# Patient Record
Sex: FEMALE | Race: BLACK OR AFRICAN AMERICAN | Employment: FULL TIME | URBAN - METROPOLITAN AREA
[De-identification: names, ages, dates, MRNs, and addresses within clinical notes are randomized per-mention and may not be internally consistent; named-entity substitution may affect disease eponyms.]

---

## 2021-09-04 LAB
CREATININE, EXTERNAL: 1.09
HBA1C MFR BLD HPLC: 6.1 %
LDL-C, EXTERNAL: 86
MICROALBUMIN UR TEST STR-MCNC: 10.2 MG/DL

## 2022-03-07 ENCOUNTER — OFFICE VISIT (OUTPATIENT)
Dept: ENDOCRINOLOGY | Age: 62
End: 2022-03-07
Payer: COMMERCIAL

## 2022-03-07 VITALS
BODY MASS INDEX: 33.99 KG/M2 | SYSTOLIC BLOOD PRESSURE: 123 MMHG | HEIGHT: 65 IN | DIASTOLIC BLOOD PRESSURE: 74 MMHG | WEIGHT: 204 LBS | HEART RATE: 60 BPM

## 2022-03-07 DIAGNOSIS — E83.52 HYPERCALCEMIA: Primary | ICD-10-CM

## 2022-03-07 DIAGNOSIS — E11.65 TYPE 2 DIABETES MELLITUS WITH HYPERGLYCEMIA, WITHOUT LONG-TERM CURRENT USE OF INSULIN (HCC): ICD-10-CM

## 2022-03-07 PROCEDURE — 99204 OFFICE O/P NEW MOD 45 MIN: CPT | Performed by: INTERNAL MEDICINE

## 2022-03-07 RX ORDER — METFORMIN HYDROCHLORIDE 1000 MG/1
1000 TABLET ORAL 2 TIMES DAILY
COMMUNITY
Start: 2021-12-03

## 2022-03-07 RX ORDER — ATORVASTATIN CALCIUM 20 MG/1
20 TABLET, FILM COATED ORAL DAILY
COMMUNITY
Start: 2022-02-05

## 2022-03-07 NOTE — PROGRESS NOTES
Chief Complaint   Patient presents with    New Patient     PCP and Pharmacy verified    Diabetes    Other     high calcium levels       HPI  This is a 61-year-old woman referred for evaluation of type 2 diabetes mellitus and hypercalcemia. Regarding the diabetes, she was diagnosed approximately 6 years ago. Both her brother and 2 of her sisters have or had diabetes as well. Sh has  been pregnant in the past but did not have diabetes during the pregnancy. She has only been taking Metformin and her most recent A1c was 6.1%. There is a repeat A1c pending at the time of this note. She says she has had no complications related to her diabetes. She does not check blood sugars. She works as a . Breakfast is usually a breakfast sandwich and coffee. She may or may not have a sandwich and chips for lunch. Peggi Castleman is usually a meat starch and a nonstarchy vegetable. She sometimes has fruit or doughnut when she goes to bed at night. She says she sleeps well at night. She denies nocturia or yeast infections. She walks extensively and consistently and has done so for quite some time. Regarding the hypercalcemia, she was noted to have a calcium of 11.5 in September 2021. Other laboratory tests obtained in conjunction with the calcium include a 25-hydroxy vitamin D of 38.3, creatinine 1.09, albumin 4.3, PTH 49, alkaline phosphatase 59. There is no family history of hypercalcemia that she is aware of. She takes vitamin D 1000 units a day and has done so for several years. She does not drink milk and does not take calcium supplements. She denies symptoms of hypercalcemia. Notably she denies constipation.   ROS  Review of Systems - General ROS: negative  Psychological ROS: negative  Ophthalmic ROS: negative  ENT ROS: negative  Respiratory ROS: no cough, shortness of breath, or wheezing  Cardiovascular ROS: no chest pain or dyspnea on exertion  Gastrointestinal ROS: no abdominal pain, change in bowel habits, or black or bloody stools  Genito-Urinary ROS: no dysuria, trouble voiding, or hematuria  Musculoskeletal ROS: negative  Neurological ROS: no TIA or stroke symptoms  Dermatological ROS: negative  Family History   Problem Relation Age of Onset    Breast Cancer Paternal Aunt         malignant    Hypertension Other         Social History     Socioeconomic History    Marital status:    Occupational History    Occupation: security guard   Tobacco Use    Smoking status: Current Every Day Smoker     Packs/day: 0.50     Types: Cigarettes    Smokeless tobacco: Never Used   Substance and Sexual Activity    Alcohol use:  Yes     Alcohol/week: 0.0 standard drinks     Comment: seldom    Drug use: No    Sexual activity: Yes     Partners: Male        Vitals:    03/07/22 1054   BP: 123/74   Pulse: 60   Weight: 204 lb (92.5 kg)   Height: 5' 5\" (1.651 m)   PainSc:   0 - No pain        Physical Examination: General appearance - alert, well appearing, and in no distress  Mental status - alert, oriented to person, place, and time  Neck - supple, no significant adenopathy, thyroid exam: thyroid is normal in size without nodules or tenderness  Chest - clear to auscultation, no wheezes, rales or rhonchi, symmetric air entry  Heart - normal rate, regular rhythm, normal S1, S2, no murmurs, rubs, clicks or gallops  Abdomen - soft, nontender, nondistended, no masses or organomegaly  Neurological - alert, oriented, normal speech, no focal findings or movement disorder noted  Musculoskeletal - no joint tenderness, deformity or swelling  Extremities - peripheral pulses normal, no pedal edema, no clubbing or cyanosis  Skin - normal coloration and turgor, no rashes, no suspicious skin lesions noted    Diabetic foot exam:     Left: Reflexes 2+     Vibratory sensation normal    Filament test normal sensation with micro filament   Pulse DP: 2+ (normal)   Pulse PT: 2+ (normal)   Deformities: None  Right: Reflexes 2+   Vibratory sensation normal   Filament test normal sensation with micro filament   Pulse DP: 2+ (normal)   Pulse PT: 2+ (normal)   Deformities: None    ASSESSMENT & PLAN    Impression  1. Type 2 diabetes mellitus with a A1c of 6.1% on Metformin at 1000 mg twice daily. 2.  History of hypercalcemia. PTH is not significantly elevated and the level is sufficiently low to decrease the likelihood of hyperparathyroidism. Given the fact that the PTH is not suppressed, the likelihood of PTH RP elevation is also low. The vitamin D level is normal but not significantly elevated so the likelihood of vitamin D toxicity seems low. The most likely etiology is [address]. Plan:  1. All laboratory tests were obtained by her primary care physician today. I will request the results of those laboratory tests and we will determine future direction  2. If the calcium level is elevated and the PTH and vitamin D are unchanged, the only additional test would be a 24-hour urine for calcium and creatinine to rule out [address]. 3.  Regarding her diabetes, if the A1c has increased, we did go over all available medications including GLP-1 agonists which might be a nice next step for her  4. Follow-up will be on an as-needed basis. ADDENDUM Labs from PCP 3/7/2022  PTH 49  Ca 10.8  Alb 4.2  PO4 3.0  25OH-vit D 43.7    24-hour urine calcium 163  24-hour urine creatinine 1318    Fractional excretion calcium to creatinine is equal to 0.0114 consistent with [address]. The patient was called and advised that there was no concern about her calcium.

## 2022-03-09 DIAGNOSIS — E83.52 HYPERCALCEMIA: ICD-10-CM

## 2022-03-30 LAB
CALCIUM 24H UR-MCNC: 12.5 MG/DL
CALCIUM 24H UR-MRATE: 163 MG/24 HR (ref 0–320)
CREAT 24H UR-MRATE: 1318 MG/24 HR (ref 800–1800)
CREAT UR-MCNC: 101.4 MG/DL

## 2023-07-22 LAB
HBA1C MFR BLD HPLC: 5.9 %
LDL CHOLESTEROL, EXTERNAL: 75

## 2023-07-31 ENCOUNTER — OFFICE VISIT (OUTPATIENT)
Age: 63
End: 2023-07-31
Payer: COMMERCIAL

## 2023-07-31 VITALS
WEIGHT: 190 LBS | HEIGHT: 65 IN | SYSTOLIC BLOOD PRESSURE: 148 MMHG | HEART RATE: 57 BPM | DIASTOLIC BLOOD PRESSURE: 75 MMHG | BODY MASS INDEX: 31.65 KG/M2

## 2023-07-31 DIAGNOSIS — E83.52 HYPERCALCEMIA: Primary | ICD-10-CM

## 2023-07-31 LAB — HBA1C MFR BLD: 6 %

## 2023-07-31 PROCEDURE — 83036 HEMOGLOBIN GLYCOSYLATED A1C: CPT | Performed by: INTERNAL MEDICINE

## 2023-07-31 PROCEDURE — 3077F SYST BP >= 140 MM HG: CPT | Performed by: INTERNAL MEDICINE

## 2023-07-31 PROCEDURE — 99213 OFFICE O/P EST LOW 20 MIN: CPT | Performed by: INTERNAL MEDICINE

## 2023-07-31 PROCEDURE — 3078F DIAST BP <80 MM HG: CPT | Performed by: INTERNAL MEDICINE

## 2023-07-31 NOTE — PROGRESS NOTES
This is a 81-year-old woman referred for evaluation of type 2 diabetes mellitus and hypercalcemia. Regarding the diabetes, she was diagnosed approximately 6 years ago. Both her brother and 2 of her sisters have or had diabetes as well. She has  been pregnant in the past but did not have diabetes during the pregnancy. She has only been taking Metformin  and her most recent A1c was 6.1%. She says she has had no complications related to her diabetes. She does not check blood sugars. She works as a . Breakfast is usually a breakfast  sandwich and coffee. She may or may not have a sandwich and chips for lunch. Maple Siddharth is usually a meat starch and a nonstarchy vegetable. She sometimes has fruit or doughnut when she goes to bed at night. She says she sleeps well at night. She denies  nocturia or yeast infections. She walks extensively and consistently and has done so for quite some time. Regarding the hypercalcemia, she was noted to have a calcium of 11.5 in September 2021. Other laboratory tests obtained in conjunction with the calcium include a 25-hydroxy vitamin D of 38.3, creatinine 1.09, albumin 4.3, PTH 49, alkaline phosphatase  59. There is no family history of hypercalcemia that she is aware of. She takes vitamin D 1000 units a day and has done so for several years. She does not drink milk and does not take calcium supplements. She denies symptoms of hypercalcemia. Notably  she denies constipation      Labs from PCP 3/7/2022   PTH 49   Ca 10.8   Alb 4.2   PO4 3.0   25OH-vit D 43.7      24-hour urine calcium 163   24-hour urine creatinine 1318     Fractional excretion calcium to creatinine is equal to 0.0114 consistent with Familial Hypocalciuric Hypercalcemia    The patient is referred again because of a calcium level of 11.8. Albumin 4.6. Creatinine 1.03. Patient tells me that there has been no change in signs or symptoms. Impression  1.   Familial hypocalciuric

## 2023-12-09 LAB — HBA1C MFR BLD HPLC: 6 %

## 2023-12-12 NOTE — PROGRESS NOTES
Received labs from Dr Kayleigh Lake 12/8/2023    Ca 11.6  PTH 46  Pi 2.6  Creat 1.1    Will discuss with pt